# Patient Record
Sex: MALE | Race: AMERICAN INDIAN OR ALASKA NATIVE
[De-identification: names, ages, dates, MRNs, and addresses within clinical notes are randomized per-mention and may not be internally consistent; named-entity substitution may affect disease eponyms.]

---

## 2018-12-16 NOTE — NUR
PT CONFUSED AND AGITATED. DECLINED INSULIN AND SCHEDULAED MEDICATION. REFUSED
TO TO BE SWABBED TO R/O MRSA. THREATENING TO HIT THIS NURSE. HARD TO REDIRECT
AND REORIENT. WILL CONTINUE TO MONITOR.

## 2018-12-17 NOTE — NUR
SHIFT SUMMARY
PT A&O TO SELF, CONFUSED AND AGGITATED AND WOULD TRY TO HIT WHEN STAFF'S
PROVIDING CARE. REORIENTED AND REDIRECTED FREQUENTLY. IVF LR RUNNING. SLEPT ON
AND OFF. BED ALARM ON AND 3 SIDERAILS UP FOR SAFETY. CALL LIGHT IN REACH.
REPORT GIVEN TO DAY RN.

## 2018-12-17 NOTE — NUR
HE GOES BY [facility]. HE IS IN CONTACT ISOLATION FOR HX OF MRSA. HE IS CONFUSED AND
WAS COMBATIVE FIRST THING THIS MORNING WITH THE CNA BUT HAS BEEN MUCH MORE
PLEASANT WITH HER AND EVERYONE ELSE THE REST OF THE DAY. HE NEEDS ASSIST WITH
EATING. HIS ARMS AND HANDS ARE STIFF BUT HE CAN USE THEM TO FEED HIMSELF. DUE
TO DEMENTIA THOUGH HE STOPS EATING TOO QUICKLY AND LETS IT SIT THERE IF WE
DON'T FEED HIM. LOW GRADE FEVER THIS MORNING CORRECTED ON IT'S OWN. 2ND LITER
OF LACTATED RINGERS WILL BE DONE NEAR SHIFT CHANGE. HIS STEPSON WAS HERE TO
SEE HIM THIS MORNING. THE PALLIATIVE CARE NURSE ALSO TALKED TO HIM ON THE
PHONE. CODE STATUS WAS CORRECTED. WE ALSO RECEIVED A COPY OF HIS POLST FROM
[facility]. HE IS IINCONTINENT IN ATTENDS. WE WILL GET HIM UP TO THE CHAIR
NOW FOR DINNER WITH CHAIR ALARM IN PLACE. TEMP JUST NOW CHECKED. FEVER 100.2.

## 2018-12-17 NOTE — NUR
INITIAL [facility] CARE VISIT TO DETERMINE GOALS OF CARE, DISCUSS ADVANCED CARE
PLANNING AND CODE STATUS CLARIFICATION.  UPDATE RECEIVED FROM  AND RN PRIOR
TO MY VISIT.  PT WAS ASLEEP IN BED WHEN I CAME BY.  GIANFRANCO SON WAS IN EARLIER
AND I CONTACTED HIM BY PHONE.  HE REPORTS PT IS AT HIS BASELINE LEVEL OF
CONFUSION AND DISTRESS RE: "WHERE HIS THNGS ARE" AND "WHAT IS HAPPENING". EMR
REVIEWED AND NO DOCUMENTATION FOUND FOR AD, POLST OR CODE STATUS DESIRED.
GOALS OF CARE, ADVANCED CARE PLANNING AND CODE STATUS CLARIFIED WITH GIANFRANCO GERARDO.
HE STATES PT WOULD NOT WANT TO BE RESUSCITATED AND HAS HAD A DNR DIRECTIVE IN
PLACE FOR SOME TIME.  HE BELIEVES THERE IS DOCUMENTATION ON THIS AT [facility].  HE IS APPRECIATIVE OF THE CONVERSATION.  HE STATES ESTEE HAS A SIMILAR
INFECTION ABOUT A YEAR AGO AND HAS BEEN STABLE FOR THE MOST PART BUT SLOWLY
DECLINING.  WE DISCUSSED HOSPICE SERVICES AT [facility] IN THE FUTURE IF FAMILY AND
STAFF FELT REPEAT HOSPITALIZATION WAS TOO TRAUMATIZING TO PT.  JOSE WAS
ENCOURAGED TO DISCUSS EOL CARE WITH PT'S PCP AND [facility] STAFF AND HE AND
I DISCUSSED OPTIONS FOR OPTIMAL S/S MANAGEMENT. MARICELSON REQUESTS WE CHANGE
HIS CURRENT FULL CODE STATUS TO DNR.  T/C TO [facility] [place] TO REQUEST ANY
DOCUMENTATION THEY HAVE FOR POLST OR AD BE FAXED TO US.  T/C TO  AND RN WITH
REPORT OF ABOVE.   CHANGED CODE STATUS TO DNR.  [facility] AND PT'S STEP
SON INFORMED THAT PT MAY BE READY FOR D/C BACK TO [facility] IN THE NEXT DAY
OR TWO.  BOTH VERBALIZED UNDERSTANDING AND AGREEMENT WTIH THIS PLAN.

## 2018-12-18 NOTE — NUR
HE IS BEING FED DINNER. HE EATS AND DRINKS WELL. HE CHEWED A FEW PILLS TODAY.
MAY TRY APPLESAUCE IN THE FUTURE. SENNA GIVEN FOR NO BM IN 2 DAYS. HIS R ARM
IS HURTING HIM MORE TODAY THAN YESTERDAY. NO OTHER CHANGES.

## 2018-12-18 NOTE — NUR
HE HAS BEEN SLEEPING MORE TODAY THAN YESTERDAY. IT IS HARD TO GET A RELIABLE
TEMPERATURE READING ON HIM. HE IS LIGHTLY FLUSHED A LOT OF THE TIME. HE WAS
MORE FLUSHED BEFORE LUNCH. TEMPERATURE 99.0 BOTH TEMPORALLY AND ORALLY. HE
EATS AND DRINKS WELL. URINE SHOWS ESBL. ANTIBIOTIC CHANGED. CBG WAS OVER 400
AT LUNCHTIME. INSULIN ORDERS ALL INCREASED. HE REMAINS REGULARLY INCONTINENT
OF LARGE AMTS OF URINE.

## 2018-12-18 NOTE — NUR
SHIFT SUMMARY:  PT IS ALERT AND ORIENTED.  PT IS CALM AND COOPERATIVE WITH
CARE.  PT CALLS APPROPRIATELY.  PT REQUIRES LIFT FOR TRANSFERS, 2-3 PERSON
ASSIST FOR CHANGES.  PT HAD INCONTINENT BM OVERNIGHT, CHANGED AND CLEANED.  PT
WORE CPAP OVERNIGHT, O2 8 L BLEED IN.  PT DENIES PAIN, NAUSEA, AND VOMITING.
PT REPORTS INTERMITTENT SOB AT BASELINE.  PT SLEPT PERIODICALLY THROUGHOUT THE
NIGHT.  NO ACUTE CHANGES OR COMPLICATIONS THIS SHIFT.  BED IN LOW POSITION,
CALL LIGHT WITHIN REACH.

## 2018-12-18 NOTE — NUR
SHIFT SUMMARY:  PT IS ALERT TO SELF.  PT WAS CALM AND COOPERATIVE WITH CARE.
PT DID NOT USE HIS CALL LIGHT OVERNIGHT.  PT NOT OUT OF BED OVERNIGHT, MAX
ASSIST FOR TRANSFERS.  PT HAD INCONTINENT VOIDS OVERNIGHT, CHANGED AND CLEANED
AS NEEDED.  PT SLEPT PERIODICALLY THROUGHOUT THE NIGHT.  PT DENIES PAIN,
NAUSEA, VOMITING, AND SOB.  NO ACUTE CHANGES OR COMPLICATIONS THIS SHIFT.  BED
IN LOW POSITION, CALL LIGHT WITHIN REACH, BED ALARM SET.  WILL REPORT TO DAY
NURSE.

## 2018-12-19 NOTE — NUR
PT A&O TO SELF. VS WITHIN NORMAL LIMITS. PAIN IN RIGHT ARM TREATED PER MAR. PT
IS BEDBOUND AND Q2 TURN. INCONTINENT OF BOWEL AND BLADDER. PT SLEPT MOST OF
DAY AND DID NOT HAVE MUCH OF AN APPETITE. CALL LIGHT AND PERSONAL BELONGINGS
WITHIN REACH. REPORT GIVEN TO ONCOMING NURSE.

## 2018-12-19 NOTE — NUR
SHIFT SUMMARY
 
PT MAINTAINS LOW GRADE FEVERS, PT HAS TRENDED THAT WAY FOR MOST OF HIS
ADMISSION. TYLENOL GIVEN X1 THIS SHIFT. PT A/OX1 TO SELF AND FOR THE MOST PART
HAS BEEN PLESANT AND COOPERATIVE WITH CARE. CAN BE SOMEWHAT RESISTANT WITH
ATTENDS CHANGES. ASSESSMENT HAS REMAINED UNCHAGED. VITALS STABLE. SLEEPS T/O
THE NIGHT. WILL CONTINUE TO MONITOR AND REPORT TO ONCOMING RN.

## 2018-12-20 NOTE — NUR
SHIFT SUMMARY
PATIENT HAD NO ACUTE CHANGES OBSERVED DURING THE SHIFT. AXOX 1 TO SELF AND
BEDFAST. DENIES PAIN, SOB, AND N/V. PIV REMAINS INTACT. . VSS/AFEBRILE.
TAKES MEDS WHOLE WITH WATER. SLEPT MOST OF THE SHIFT. CALL LIGHT IN REACH. BED
IN LOWEST POSITION. WILL CONTINUE TO MONITOR UNTIL DAY SHIFT NURSE ASSUMES
CARE.

## 2018-12-20 NOTE — NUR
DISCHARGE NOTE
RESPIRATORY TREATMENT PROVIDED BEFORE TRANSPORT TO [facility]. POWERGLIDE TO
LEFT ARM PATENT AND SITE IS WNL. PT AWAKE, CONFUSED AT TIME OF DISCHARGE. RX
AND DISCHARGE INSTRUCTIONS FAXED TO [facility]. REPORT GIVEN.

## 2019-04-16 NOTE — NUR
2 PERSON ASSIST WITH CHANGING OF SOILED CLOTHING/BEDDING. PER DR SANTILLAN
VERBAL ORDER, OKAY TO PLACE CERNA CATHETER. PT TOLERATES WELL. CLEAR YELLOW
URINE OUTPUT NOTED. VSS. NADN. CAREGIVER REMAINS AT BEDSIDE. CALL LIGHT WITHIN
REACH. UPDATED ON TREAMENT STATUS

## 2019-04-16 NOTE — NUR
SHIFT SUMMARY
ASSUMED CARE OF PT AT APPROXIMATELY 1630. PT SNORING RESPIRATIONS AND ONLY
RESPONDS TO PAINFUL STIMULI. O2 SATS >92% ON 2L NC. VS STABLE. HR APPEARS TO
BE SA BLOCK WITH OCCASIONAL PVC WITH A RATE IN THE 60'S. ULCERATION TO LEFT
GREAT TOE APPROXIMATELY 2cm. RIGHT GREAT TOE ULCER COVERED WITH BANDAGE.
RIGHT GROIN SITE CLEAR FROM ANY SIGNS OF BLEEDING, HEMATOMA, OR NUMBNESS AND
TINGLING. PT TO STAY ON BEDREST FOR 3 HOURS POST ANGIOGRAM. DR. SANTILLAN CALLED
TO OBTAIN FURTHER ORDERS. WILL CONTINUE TO MONITOR AND REPORT TO ONCOMING RN.

## 2019-04-17 NOTE — NUR
REPORT TO [facility] / TRANSPORTATION:
REPORT HAS BEEN CALLED TO CAREGIVER AT [facility] THAT WILL BE ASSUMING CARE.
SHE DENIES FURTHER QUESTIONS & HAS BEEN INFORMED THAT D/C PACKET & INFO
REGARDING THE ANGIOSEAL, ETC, WILL BE SENT OUT W/ THE PT & TRANSPORT
PERSONNEL.
 
DEANGELO DUBOSE, DISCHARGE PLANNER RN, IS ARRANGING TRANSPORTATION BACK TO [facility]
FOR THIS PT. ANN WILL BE COMPLETING THE TRANSPORT, TIME REMAINS UNKNOWN.
WILL CONTINUE TO MONITOR & UPDATE AS NEEDED.

## 2019-04-17 NOTE — NUR
ASSUMED CARE:
REPORT RECEIVED FROM SOULEYMANE DUBOSE RN. ASSUMED CARE OF THIS PT AT APPROX 0700.
ON ASSESSMENT, THE PT IS AWAKE & PLEASANTLY CONFUSED. HE TALKS OF HAVING "A
HEART ATTACK AT [facility]" AS THOUGH HE IS NOT CURRENTLY HOSPITALIZED.
FREQUENT REORIENTATION HAS BEEN NECESSARY THIS AM & THE PT's WORDS ARE MOSTLY
NONSENSICAL & UNRELATED TO PRIOR CONVERSATION. HE IS DIFFICULT TO REDIRECT AT
TIMES. PLAN IS FOR D/C BACK TO PT's HOME, [facility], TODAY. HE REMAINS ON RA
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
NONCONDUCTED P WAVES NOTED. OCCASIONAL PVCs, HR 80s.
WILL CONTINUE TO MONITOR & UPDATE AS NEEDED.

## 2019-04-17 NOTE — NUR
SHIFT SUMMARY
PT ABLE TO ANSWER QUESTIONS WITH YES OR NO ANSWERS DURING THE NIGHT. HE HAD
SOME CLEARING AS SHIFT CONTINUED. HE IS STILL NOT ABLE TO APPROPRIATELY
RESPOND TO COMMANDS. PT HAS DENIED AND WAS NOT OBSERVED TO BE IN ANY PAIN. HE
SLEPT WELL DURING THE NIGHT AND VITALS WERE STABLE T/O THE SHIFT. PT GROIN
CONTINUE TO LOOKS GOOD WITHOUT ANY SIGNS OF BLEEDING. HE HAS BEEN ROUNDED ON
FREQUENTLY AS HE HAS NOT BEEN ABLE TO EFFECTIVELY MAKE NEEDS KNOWN. BED HAS
REMAINED IN THE LOWEST POSITION WITH 2X SIDE RAILS IN PLACE. PT HAS BEEN
SATTING  PERCENT ON O2 SO IT WAS TURNED OFF. AT ROOM AIR, PATIENT HAS
REMAINED ABOVE 95 PERCENT. HE WILL CONTINUE TO BE MONITORED UNTIL HANDOFF TO
DAYSHIFT RN.

## 2019-04-17 NOTE — NUR
DISCHARGE TO HOME ([facility]):
PIV & HEART MONITOR HAVE BEEN REMOVED. CERNA CATH HAS ALSO BEEN REMOVED &
ATTENDS PLACED ONTO PT WHO IS INCONTINENT OF BOTH BOWEL & BLADDER AT BASELINE.
HE HAS TOLERATED THESE INTERVENTIONS WELL, DID BECOME SLIGHTLY MORE AGITATED
WHEN ATTENDS PLACED & PT HAD TO ROLL ONTO SIDE. DRESSING TO R GROIN ACCESS
SITE IS WNL. AREA FREE OF BLEEDING, BRUISING OR HEMATOMA FORMATION AT
DISCHARGE. PLAN IS FOR  VAN TO PICK PT UP IN ADMITTING LOBBY AT 1400. HE HAS
NO CLOTHES W/ HIM AT THIS TIME & IS WEARING HOSPITAL GOWN FOR D/C. PT's
PERSONAL GLASSES & D/C PACKET TO BE TAKEN OUT W/ HIM.

## 2019-06-11 ENCOUNTER — HOSPITAL ENCOUNTER (OUTPATIENT)
Dept: HOSPITAL 95 - MHTC | Age: 84
Discharge: HOME | End: 2019-06-11
Attending: RADIOLOGY
Payer: MEDICARE

## 2019-06-11 VITALS — HEIGHT: 67 IN | WEIGHT: 185.19 LBS | BODY MASS INDEX: 29.07 KG/M2

## 2019-06-11 DIAGNOSIS — N18.2: ICD-10-CM

## 2019-06-11 DIAGNOSIS — E78.5: ICD-10-CM

## 2019-06-11 DIAGNOSIS — F17.220: ICD-10-CM

## 2019-06-11 DIAGNOSIS — Z79.899: ICD-10-CM

## 2019-06-11 DIAGNOSIS — I12.9: ICD-10-CM

## 2019-06-11 DIAGNOSIS — E11.69: ICD-10-CM

## 2019-06-11 DIAGNOSIS — E11.22: ICD-10-CM

## 2019-06-11 DIAGNOSIS — I70.201: ICD-10-CM

## 2019-06-11 DIAGNOSIS — M86.179: ICD-10-CM

## 2019-06-11 DIAGNOSIS — E11.51: Primary | ICD-10-CM

## 2019-06-11 PROCEDURE — C1887 CATHETER, GUIDING: HCPCS

## 2019-06-11 PROCEDURE — C1760 CLOSURE DEV, VASC: HCPCS

## 2019-06-11 PROCEDURE — C1894 INTRO/SHEATH, NON-LASER: HCPCS

## 2019-06-11 PROCEDURE — C1769 GUIDE WIRE: HCPCS

## 2019-06-11 PROCEDURE — C1725 CATH, TRANSLUMIN NON-LASER: HCPCS

## 2019-06-11 NOTE — NUR
PT BROUGHT TO RECOVERY ROOM POST PROCEDURE. LEFT FEMORAL ARTERIAL ACCESS SITE
APPEARS TO BE STABLE. ANGIOSEAL CLOSURE DEVICE USED. CLEAR TEGADERM INTACT.
VSS. CBG , LUNCH TRAY ORDERED, AWAITING ARRIVAL. PT APPEARS TO BE
COMFORTABLE AT THIS TIME, SNORING WITH EYES CLOSED. WILL CONTINUE TO MONITOR.

## 2019-06-11 NOTE — NUR
REPORT CALLED TO MANUELA GARCIA AT [facility]. INFORMED HER THAT PT WILL BE
ARRIVING BACK AT HOME AROUND [phone] [facility]. ALSO
INFORMED HER THAT PT WILL HAVE A PACKET OF DISCHARGE INFORMATION FOR HER TO
REVIEW. SHE VERBALIZED UNDERSTANDING.

## 2019-06-28 ENCOUNTER — HOSPITAL ENCOUNTER (EMERGENCY)
Dept: HOSPITAL 95 - ER | Age: 84
Discharge: HOME | End: 2019-06-28
Payer: MEDICARE

## 2019-06-28 VITALS — BODY MASS INDEX: 27.28 KG/M2 | WEIGHT: 180.01 LBS | HEIGHT: 68 IN

## 2019-06-28 DIAGNOSIS — E11.9: ICD-10-CM

## 2019-06-28 DIAGNOSIS — I10: ICD-10-CM

## 2019-06-28 DIAGNOSIS — Z87.891: ICD-10-CM

## 2019-06-28 DIAGNOSIS — J18.9: Primary | ICD-10-CM

## 2019-06-28 DIAGNOSIS — Z85.038: ICD-10-CM

## 2019-09-19 ENCOUNTER — HOSPITAL ENCOUNTER (OUTPATIENT)
Dept: HOSPITAL 95 - LAB SHORT | Age: 84
End: 2019-09-19
Attending: FAMILY MEDICINE
Payer: MEDICARE

## 2019-09-19 DIAGNOSIS — N39.0: Primary | ICD-10-CM

## 2019-09-19 LAB
PROT UR STRIP-MCNC: (no result) MG/DL
RBC #/AREA URNS HPF: (no result) /HPF (ref 0–2)
SP GR SPEC: 1.01 (ref 1–1.02)
UROBILINOGEN UR STRIP-MCNC: (no result) MG/DL
WBC #/AREA URNS HPF: (no result) /HPF (ref 0–5)

## 2019-09-24 ENCOUNTER — HOSPITAL ENCOUNTER (OUTPATIENT)
Dept: HOSPITAL 95 - LAB | Age: 84
Discharge: HOME | End: 2019-09-24
Attending: FAMILY MEDICINE
Payer: MEDICARE

## 2019-09-24 DIAGNOSIS — N39.0: Primary | ICD-10-CM

## 2020-02-17 ENCOUNTER — HOSPITAL ENCOUNTER (OUTPATIENT)
Dept: HOSPITAL 95 - LAB | Age: 85
Discharge: HOME | End: 2020-02-17
Attending: FAMILY MEDICINE
Payer: MEDICARE

## 2020-02-17 DIAGNOSIS — N39.0: Primary | ICD-10-CM

## 2020-03-02 ENCOUNTER — HOSPITAL ENCOUNTER (OUTPATIENT)
Dept: HOSPITAL 95 - LAB | Age: 85
Discharge: HOME | End: 2020-03-02
Attending: FAMILY MEDICINE
Payer: MEDICARE

## 2020-03-02 DIAGNOSIS — N39.0: Primary | ICD-10-CM

## 2021-11-30 ENCOUNTER — HOSPITAL ENCOUNTER (OUTPATIENT)
Dept: HOSPITAL 95 - LAB | Age: 86
End: 2021-11-30
Attending: PODIATRIST
Payer: MEDICARE

## 2021-11-30 DIAGNOSIS — M79.673: ICD-10-CM

## 2021-11-30 DIAGNOSIS — L97.509: ICD-10-CM

## 2021-11-30 DIAGNOSIS — E11.51: Primary | ICD-10-CM

## 2021-11-30 DIAGNOSIS — Z79.4: ICD-10-CM

## 2021-12-05 ENCOUNTER — HOSPITAL ENCOUNTER (EMERGENCY)
Dept: HOSPITAL 95 - ER | Age: 86
Discharge: HOME | End: 2021-12-05
Payer: MEDICARE

## 2021-12-05 VITALS — HEIGHT: 68 IN | BODY MASS INDEX: 25.76 KG/M2 | WEIGHT: 170 LBS
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
## 2021-12-05 DIAGNOSIS — E11.9: ICD-10-CM

## 2021-12-05 DIAGNOSIS — Z23: ICD-10-CM

## 2021-12-05 DIAGNOSIS — S00.03XA: Primary | ICD-10-CM

## 2021-12-05 DIAGNOSIS — Z79.899: ICD-10-CM

## 2021-12-05 DIAGNOSIS — Z79.82: ICD-10-CM

## 2021-12-05 DIAGNOSIS — Z88.0: ICD-10-CM

## 2021-12-05 DIAGNOSIS — I10: ICD-10-CM

## 2021-12-05 DIAGNOSIS — Z79.4: ICD-10-CM

## 2021-12-10 ENCOUNTER — HOSPITAL ENCOUNTER (EMERGENCY)
Dept: HOSPITAL 95 - ER | Age: 86
Discharge: HOME | End: 2021-12-10
Payer: MEDICARE

## 2021-12-10 VITALS — BODY MASS INDEX: 24.99 KG/M2 | WEIGHT: 150 LBS | HEIGHT: 65 IN

## 2021-12-10 DIAGNOSIS — E11.9: ICD-10-CM

## 2021-12-10 DIAGNOSIS — T50.905A: ICD-10-CM

## 2021-12-10 DIAGNOSIS — Z79.4: ICD-10-CM

## 2021-12-10 DIAGNOSIS — Z79.82: ICD-10-CM

## 2021-12-10 DIAGNOSIS — Z79.899: ICD-10-CM

## 2021-12-10 DIAGNOSIS — Z88.0: ICD-10-CM

## 2021-12-10 DIAGNOSIS — I44.1: Primary | ICD-10-CM

## 2021-12-10 DIAGNOSIS — F03.90: ICD-10-CM

## 2021-12-10 DIAGNOSIS — E87.5: ICD-10-CM

## 2021-12-10 DIAGNOSIS — I10: ICD-10-CM

## 2021-12-10 LAB
ALBUMIN SERPL BCP-MCNC: 3.1 G/DL (ref 3.4–5)
ALBUMIN/GLOB SERPL: 0.8 {RATIO} (ref 0.8–1.8)
ALT SERPL W P-5'-P-CCNC: 26 U/L (ref 12–78)
ANION GAP SERPL CALCULATED.4IONS-SCNC: 7 MMOL/L (ref 6–16)
AST SERPL W P-5'-P-CCNC: 27 U/L (ref 12–37)
BASOPHILS # BLD AUTO: 0.04 K/MM3 (ref 0–0.23)
BASOPHILS NFR BLD AUTO: 0 % (ref 0–2)
BILIRUB SERPL-MCNC: 0.3 MG/DL (ref 0.1–1)
BUN SERPL-MCNC: 39 MG/DL (ref 8–24)
CALCIUM SERPL-MCNC: 8.9 MG/DL (ref 8.5–10.1)
CHLORIDE SERPL-SCNC: 105 MMOL/L (ref 98–108)
CO2 SERPL-SCNC: 25 MMOL/L (ref 21–32)
CREAT SERPL-MCNC: 2.08 MG/DL (ref 0.6–1.2)
DEPRECATED RDW RBC AUTO: 45 FL (ref 35.1–46.3)
EOSINOPHIL # BLD AUTO: 0.01 K/MM3 (ref 0–0.68)
EOSINOPHIL NFR BLD AUTO: 0 % (ref 0–6)
ERYTHROCYTE [DISTWIDTH] IN BLOOD BY AUTOMATED COUNT: 12.7 % (ref 11.7–14.2)
GLOBULIN SER CALC-MCNC: 3.7 G/DL (ref 2.2–4)
GLUCOSE SERPL-MCNC: 178 MG/DL (ref 70–99)
HCT VFR BLD AUTO: 35.2 % (ref 37–53)
HGB BLD-MCNC: 11.2 G/DL (ref 13.5–17.5)
IMM GRANULOCYTES # BLD AUTO: 0.05 K/MM3 (ref 0–0.1)
IMM GRANULOCYTES NFR BLD AUTO: 1 % (ref 0–1)
KETONES UR STRIP-MCNC: (no result) MG/DL
LYMPHOCYTES # BLD AUTO: 1.83 K/MM3 (ref 0.84–5.2)
LYMPHOCYTES NFR BLD AUTO: 18 % (ref 21–46)
MCHC RBC AUTO-ENTMCNC: 31.8 G/DL (ref 31.5–36.5)
MCV RBC AUTO: 97 FL (ref 80–100)
MONOCYTES # BLD AUTO: 0.51 K/MM3 (ref 0.16–1.47)
MONOCYTES NFR BLD AUTO: 5 % (ref 4–13)
NEUTROPHILS # BLD AUTO: 7.69 K/MM3 (ref 1.96–9.15)
NEUTROPHILS NFR BLD AUTO: 76 % (ref 41–73)
NRBC # BLD AUTO: 0 K/MM3 (ref 0–0.02)
NRBC BLD AUTO-RTO: 0 /100 WBC (ref 0–0.2)
PLATELET # BLD AUTO: 363 K/MM3 (ref 150–400)
POTASSIUM SERPL-SCNC: 5.8 MMOL/L (ref 3.5–5.5)
PROT SERPL-MCNC: 6.8 G/DL (ref 6.4–8.2)
PROT UR STRIP-MCNC: (no result) MG/DL
RBC #/AREA URNS HPF: (no result) /HPF (ref 0–2)
SODIUM SERPL-SCNC: 137 MMOL/L (ref 136–145)
SP GR SPEC: 1.01 (ref 1–1.02)
UROBILINOGEN UR STRIP-MCNC: (no result) MG/DL
WBC #/AREA URNS HPF: (no result) /HPF (ref 0–5)

## 2022-01-12 ENCOUNTER — HOSPITAL ENCOUNTER (OUTPATIENT)
Dept: HOSPITAL 95 - MHTC | Age: 87
Discharge: HOME | End: 2022-01-12
Attending: STUDENT IN AN ORGANIZED HEALTH CARE EDUCATION/TRAINING PROGRAM
Payer: MEDICARE

## 2022-01-12 VITALS — HEIGHT: 63 IN | WEIGHT: 167.33 LBS | BODY MASS INDEX: 29.65 KG/M2

## 2022-01-12 DIAGNOSIS — K21.9: ICD-10-CM

## 2022-01-12 DIAGNOSIS — Z88.0: ICD-10-CM

## 2022-01-12 DIAGNOSIS — E78.5: ICD-10-CM

## 2022-01-12 DIAGNOSIS — E11.51: Primary | ICD-10-CM

## 2022-01-12 DIAGNOSIS — I10: ICD-10-CM

## 2022-01-12 DIAGNOSIS — E11.40: ICD-10-CM

## 2022-01-12 DIAGNOSIS — Z79.899: ICD-10-CM

## 2022-01-12 DIAGNOSIS — L97.929: ICD-10-CM

## 2022-01-12 DIAGNOSIS — I70.213: ICD-10-CM

## 2022-01-12 DIAGNOSIS — Z20.822: ICD-10-CM

## 2022-01-12 PROCEDURE — C1760 CLOSURE DEV, VASC: HCPCS

## 2022-01-12 PROCEDURE — C2623 CATH, TRANSLUMIN, DRUG-COAT: HCPCS

## 2022-01-12 PROCEDURE — C1894 INTRO/SHEATH, NON-LASER: HCPCS

## 2022-01-12 PROCEDURE — C1887 CATHETER, GUIDING: HCPCS

## 2022-01-12 PROCEDURE — C1769 GUIDE WIRE: HCPCS

## 2022-01-12 PROCEDURE — C1725 CATH, TRANSLUMIN NON-LASER: HCPCS

## 2022-01-12 NOTE — NUR
pt cbg 128 at this time. vss. pts bedding and disposable undergarment changed.
pt laying in bed with eyes closed. pt is easily arrousable. Son Pb
notified of outcome of procedure. will continue to monitor.

## 2022-01-12 NOTE — NUR
PT DRESSED WITH 2 RN ASSISTENCE. R FEMORAL ARTERY SITE IS STABLE WITH NO
BLEEDING, OOZING OR HEMATOMA NOTED. ALVAREZ PIMENTEL AT [facility] CALLED WITH DC
AND SITE CARE INSTRUCTIONS AND DENIES ANY QUESTIONS OR CONCERNS.VSS. IV DCD
WITH CATH INTACT. ALL BELONGINGS AND DC INSTRUCTIONS SENT WITH PATIENT. PT
TAKEN TO EXIT VIA PERSONAL WHEELCHAIR BY SECOND RN WHERE RIDE WAS WAITING.

## 2022-03-08 ENCOUNTER — HOSPITAL ENCOUNTER (OUTPATIENT)
Dept: HOSPITAL 95 - MHTC | Age: 87
Discharge: HOME | End: 2022-03-08
Attending: RADIOLOGY
Payer: MEDICARE

## 2022-03-08 DIAGNOSIS — E11.51: Primary | ICD-10-CM

## 2022-03-08 DIAGNOSIS — I70.223: ICD-10-CM

## 2022-03-08 DIAGNOSIS — K21.9: ICD-10-CM

## 2022-03-08 DIAGNOSIS — E11.40: ICD-10-CM

## 2022-03-08 DIAGNOSIS — Z88.0: ICD-10-CM

## 2022-03-08 DIAGNOSIS — I10: ICD-10-CM

## 2022-03-08 DIAGNOSIS — E78.5: ICD-10-CM

## 2022-03-08 DIAGNOSIS — Z87.891: ICD-10-CM

## 2022-03-08 DIAGNOSIS — Z79.4: ICD-10-CM

## 2022-03-08 PROCEDURE — C1760 CLOSURE DEV, VASC: HCPCS

## 2022-03-08 PROCEDURE — C1769 GUIDE WIRE: HCPCS

## 2022-03-08 PROCEDURE — C1894 INTRO/SHEATH, NON-LASER: HCPCS

## 2022-03-08 PROCEDURE — C1725 CATH, TRANSLUMIN NON-LASER: HCPCS

## 2022-03-08 PROCEDURE — C9772 REVASC LITHOTRIP TIBI/PERONE: HCPCS

## 2022-03-08 PROCEDURE — C1887 CATHETER, GUIDING: HCPCS

## 2022-03-08 NOTE — NUR
PATIENT TAKEN VIA PERSONAL WHEEL CHAIR TO ER ENTRANCE WHERE HE WILL BE
TRANSPORTED TO [facility] BY [facility] TRANSPORT.

## 2022-03-08 NOTE — NUR
PHONED [facility]. SPOKE TO MANE. GVE FULL REPORT TO HER. REVIEWED POST
PROCEDURE AND PRECAUTIONS WITH HER. UPDATED ON FOOD INTAKE, VOIDS, AND BMs. NO
FURTHER QUESTIONS.

## 2022-03-08 NOTE — NUR
PHONED [facility]. SPOKE TO NURSE LOREN TO RECONCILE MEDICATIONS FOR PATIENT.
LAST DOCUMENTED CBG WAS 67 LAST NIGHT. CBG DONE HERE IN THE RECOVERY ROOM. CBG
IS 60. REPORTED TO DR SANTILLAN. ONE AMP OF D5 ORDERED AND AN D5 1/2 NS ORDERED.

## 2022-03-08 NOTE — NUR
patient returned to [facility] recovery room from cath lab. left groin site
soft and nontender , no hematoma, no bleeding dressing D&I.

## 2022-03-08 NOTE — NUR
PHONED [facility] TRANSPORT TO GIVE THEM A TIME FOR PATIENT . THEY
WIOLL BE HERE AT 7 PM AT HEART CENTER RECOVERY ROOM.

## 2022-03-08 NOTE — NUR
[facility]ED [facility] WITH REPORT. SPOKE TO [employer]. REVIEWED DISCHARGE
INSTRUCTIONS AND PRECAUTIONS AND PROCEDURAL REPORT. REVIEWED BLOOD SUGARS AND
MEDICATIONS, INCLUDING NEW MEDICATION.

## 2022-04-29 ENCOUNTER — HOSPITAL ENCOUNTER (OUTPATIENT)
Dept: HOSPITAL 95 - WOUND | Age: 87
Discharge: HOME | End: 2022-04-29
Attending: SURGERY
Payer: MEDICARE

## 2022-04-29 DIAGNOSIS — N18.6: ICD-10-CM

## 2022-04-29 DIAGNOSIS — L97.526: ICD-10-CM

## 2022-04-29 DIAGNOSIS — L97.512: ICD-10-CM

## 2022-04-29 DIAGNOSIS — L97.511: ICD-10-CM

## 2022-04-29 DIAGNOSIS — L97.822: ICD-10-CM

## 2022-04-29 DIAGNOSIS — E11.51: ICD-10-CM

## 2022-04-29 DIAGNOSIS — E11.621: Primary | ICD-10-CM

## 2022-04-29 DIAGNOSIS — Z88.0: ICD-10-CM

## 2022-04-29 DIAGNOSIS — K21.9: ICD-10-CM

## 2022-04-29 DIAGNOSIS — I12.0: ICD-10-CM

## 2022-04-29 DIAGNOSIS — E11.42: ICD-10-CM

## 2022-04-29 DIAGNOSIS — Z87.891: ICD-10-CM

## 2022-04-29 DIAGNOSIS — E78.5: ICD-10-CM

## 2022-04-29 DIAGNOSIS — E11.22: ICD-10-CM

## 2022-04-29 DIAGNOSIS — E11.622: ICD-10-CM

## 2022-04-29 DIAGNOSIS — F03.90: ICD-10-CM

## 2022-04-29 DIAGNOSIS — Z85.038: ICD-10-CM

## 2022-04-29 PROCEDURE — G0463 HOSPITAL OUTPT CLINIC VISIT: HCPCS

## 2022-04-29 PROCEDURE — A9270 NON-COVERED ITEM OR SERVICE: HCPCS

## 2022-05-06 ENCOUNTER — HOSPITAL ENCOUNTER (OUTPATIENT)
Dept: HOSPITAL 95 - WOUND | Age: 87
Discharge: HOME | End: 2022-05-06
Attending: SURGERY
Payer: MEDICARE

## 2022-05-06 DIAGNOSIS — L97.512: ICD-10-CM

## 2022-05-06 DIAGNOSIS — L89.899: ICD-10-CM

## 2022-05-06 DIAGNOSIS — L97.822: ICD-10-CM

## 2022-05-06 DIAGNOSIS — E10.621: Primary | ICD-10-CM

## 2022-05-06 DIAGNOSIS — E10.51: ICD-10-CM

## 2022-05-06 DIAGNOSIS — E10.42: ICD-10-CM

## 2022-05-06 DIAGNOSIS — L97.526: ICD-10-CM

## 2022-05-06 PROCEDURE — G0463 HOSPITAL OUTPT CLINIC VISIT: HCPCS

## 2022-05-13 ENCOUNTER — HOSPITAL ENCOUNTER (OUTPATIENT)
Dept: HOSPITAL 95 - WOUND | Age: 87
Discharge: HOME | End: 2022-05-13
Attending: SURGERY
Payer: MEDICARE

## 2022-05-13 DIAGNOSIS — S91.101S: ICD-10-CM

## 2022-05-13 DIAGNOSIS — S91.104S: ICD-10-CM

## 2022-05-13 DIAGNOSIS — L97.512: ICD-10-CM

## 2022-05-13 DIAGNOSIS — E10.51: ICD-10-CM

## 2022-05-13 DIAGNOSIS — E10.621: Primary | ICD-10-CM

## 2022-05-13 DIAGNOSIS — L97.526: ICD-10-CM

## 2022-05-13 DIAGNOSIS — E10.42: ICD-10-CM
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
## 2022-05-13 DIAGNOSIS — S91.105S: ICD-10-CM

## 2022-05-13 DIAGNOSIS — L97.822: ICD-10-CM

## 2022-05-13 PROCEDURE — A9270 NON-COVERED ITEM OR SERVICE: HCPCS

## 2022-05-13 PROCEDURE — G0463 HOSPITAL OUTPT CLINIC VISIT: HCPCS

## 2022-05-18 ENCOUNTER — HOSPITAL ENCOUNTER (INPATIENT)
Dept: HOSPITAL 95 - ER | Age: 87
LOS: 2 days | Discharge: HOSPICE | DRG: 682 | End: 2022-05-20
Attending: INTERNAL MEDICINE | Admitting: INTERNAL MEDICINE
Payer: MEDICARE

## 2022-05-18 VITALS — BODY MASS INDEX: 23.25 KG/M2 | WEIGHT: 139.55 LBS | HEIGHT: 65 IN

## 2022-05-18 DIAGNOSIS — Z98.890: ICD-10-CM

## 2022-05-18 DIAGNOSIS — N18.30: ICD-10-CM

## 2022-05-18 DIAGNOSIS — Z88.0: ICD-10-CM

## 2022-05-18 DIAGNOSIS — E11.22: ICD-10-CM

## 2022-05-18 DIAGNOSIS — Z79.4: ICD-10-CM

## 2022-05-18 DIAGNOSIS — Z66: ICD-10-CM

## 2022-05-18 DIAGNOSIS — F03.90: ICD-10-CM

## 2022-05-18 DIAGNOSIS — E78.5: ICD-10-CM

## 2022-05-18 DIAGNOSIS — I12.9: ICD-10-CM

## 2022-05-18 DIAGNOSIS — F32.A: ICD-10-CM

## 2022-05-18 DIAGNOSIS — E86.0: ICD-10-CM

## 2022-05-18 DIAGNOSIS — K92.2: ICD-10-CM

## 2022-05-18 DIAGNOSIS — Z79.899: ICD-10-CM

## 2022-05-18 DIAGNOSIS — D63.1: ICD-10-CM

## 2022-05-18 DIAGNOSIS — E11.51: ICD-10-CM

## 2022-05-18 DIAGNOSIS — N17.9: Primary | ICD-10-CM

## 2022-05-18 DIAGNOSIS — E87.0: ICD-10-CM

## 2022-05-18 DIAGNOSIS — E03.9: ICD-10-CM

## 2022-05-18 DIAGNOSIS — Z51.5: ICD-10-CM

## 2022-05-18 DIAGNOSIS — Z79.01: ICD-10-CM

## 2022-05-18 DIAGNOSIS — Z79.82: ICD-10-CM

## 2022-05-18 DIAGNOSIS — D50.9: ICD-10-CM

## 2022-05-18 DIAGNOSIS — E11.40: ICD-10-CM

## 2022-05-18 DIAGNOSIS — D72.829: ICD-10-CM

## 2022-05-18 DIAGNOSIS — Z85.038: ICD-10-CM

## 2022-05-18 DIAGNOSIS — G92.8: ICD-10-CM

## 2022-05-18 LAB
ALBUMIN SERPL BCP-MCNC: 3 G/DL (ref 3.4–5)
ALBUMIN/GLOB SERPL: 0.7 {RATIO} (ref 0.8–1.8)
ALT SERPL W P-5'-P-CCNC: 42 U/L (ref 12–78)
ANION GAP SERPL CALCULATED.4IONS-SCNC: 0 MMOL/L (ref 6–16)
ANION GAP SERPL CALCULATED.4IONS-SCNC: 5 MMOL/L (ref 6–16)
AST SERPL W P-5'-P-CCNC: 47 U/L (ref 12–37)
BASOPHILS # BLD AUTO: 0.06 K/MM3 (ref 0–0.23)
BASOPHILS NFR BLD AUTO: 0 % (ref 0–2)
BILIRUB SERPL-MCNC: 0.6 MG/DL (ref 0.1–1)
BUN SERPL-MCNC: 86 MG/DL (ref 8–24)
BUN SERPL-MCNC: 87 MG/DL (ref 8–24)
CALCIUM SERPL-MCNC: 8.1 MG/DL (ref 8.5–10.1)
CALCIUM SERPL-MCNC: 9.1 MG/DL (ref 8.5–10.1)
CHLORIDE SERPL-SCNC: 129 MMOL/L (ref 98–108)
CHLORIDE SERPL-SCNC: 130 MMOL/L (ref 98–108)
CO2 SERPL-SCNC: 24 MMOL/L (ref 21–32)
CO2 SERPL-SCNC: 28 MMOL/L (ref 21–32)
CREAT SERPL-MCNC: 3.65 MG/DL (ref 0.6–1.2)
CREAT SERPL-MCNC: 3.86 MG/DL (ref 0.6–1.2)
DEPRECATED RDW RBC AUTO: 50.5 FL (ref 35.1–46.3)
EOSINOPHIL # BLD AUTO: 0.15 K/MM3 (ref 0–0.68)
EOSINOPHIL NFR BLD AUTO: 1 % (ref 0–6)
ERYTHROCYTE [DISTWIDTH] IN BLOOD BY AUTOMATED COUNT: 13.2 % (ref 11.7–14.2)
GLOBULIN SER CALC-MCNC: 4.5 G/DL (ref 2.2–4)
GLUCOSE SERPL-MCNC: 67 MG/DL (ref 70–99)
GLUCOSE SERPL-MCNC: 93 MG/DL (ref 70–99)
HCT VFR BLD AUTO: 42.6 % (ref 37–53)
HGB BLD-MCNC: 12.9 G/DL (ref 13.5–17.5)
IMM GRANULOCYTES # BLD AUTO: 0.05 K/MM3 (ref 0–0.1)
IMM GRANULOCYTES NFR BLD AUTO: 0 % (ref 0–1)
LYMPHOCYTES # BLD AUTO: 2.14 K/MM3 (ref 0.84–5.2)
LYMPHOCYTES NFR BLD AUTO: 14 % (ref 21–46)
MCHC RBC AUTO-ENTMCNC: 30.3 G/DL (ref 31.5–36.5)
MCV RBC AUTO: 103 FL (ref 80–100)
MONOCYTES # BLD AUTO: 1.5 K/MM3 (ref 0.16–1.47)
MONOCYTES NFR BLD AUTO: 10 % (ref 4–13)
NEUTROPHILS # BLD AUTO: 11.13 K/MM3 (ref 1.96–9.15)
NEUTROPHILS NFR BLD AUTO: 74 % (ref 41–73)
NRBC # BLD AUTO: 0 K/MM3 (ref 0–0.02)
NRBC BLD AUTO-RTO: 0 /100 WBC (ref 0–0.2)
PLATELET # BLD AUTO: 331 K/MM3 (ref 150–400)
POTASSIUM SERPL-SCNC: 4.5 MMOL/L (ref 3.5–5.5)
POTASSIUM SERPL-SCNC: 5.2 MMOL/L (ref 3.5–5.5)
PROT SERPL-MCNC: 7.5 G/DL (ref 6.4–8.2)
PROT UR STRIP-MCNC: (no result) MG/DL
SODIUM SERPL-SCNC: 157 MMOL/L (ref 136–145)
SODIUM SERPL-SCNC: 159 MMOL/L (ref 136–145)
SP GR SPEC: 1.02 (ref 1–1.02)
UROBILINOGEN UR STRIP-MCNC: (no result) MG/DL
WBC #/AREA URNS HPF: (no result) /HPF (ref 0–5)

## 2022-05-18 PROCEDURE — G0378 HOSPITAL OBSERVATION PER HR: HCPCS

## 2022-05-18 PROCEDURE — A9270 NON-COVERED ITEM OR SERVICE: HCPCS

## 2022-05-18 NOTE — NUR
Transfer report from ER ALVAREZ Sanchez on 89 year old PT with Acute on chronic kidney
injury Dementia & hx of ESBL MRSA & wounds.Will be in contact isolation.
Coming from [facility] Dementia unit. DNR status, randolph placed in ER. Await
admission.

## 2022-05-19 LAB
ALBUMIN SERPL BCP-MCNC: 2.5 G/DL (ref 3.4–5)
ALBUMIN/GLOB SERPL: 0.7 {RATIO} (ref 0.8–1.8)
ALT SERPL W P-5'-P-CCNC: 36 U/L (ref 12–78)
ANION GAP SERPL CALCULATED.4IONS-SCNC: 6 MMOL/L (ref 6–16)
AST SERPL W P-5'-P-CCNC: 46 U/L (ref 12–37)
BASOPHILS # BLD: 0 K/MM3 (ref 0–0.23)
BASOPHILS NFR BLD: 0 % (ref 0–2)
BILIRUB SERPL-MCNC: 0.9 MG/DL (ref 0.1–1)
BUN SERPL-MCNC: 78 MG/DL (ref 8–24)
CALCIUM SERPL-MCNC: 8.1 MG/DL (ref 8.5–10.1)
CHLORIDE SERPL-SCNC: 128 MMOL/L (ref 98–108)
CO2 SERPL-SCNC: 22 MMOL/L (ref 21–32)
CREAT SERPL-MCNC: 3.37 MG/DL (ref 0.6–1.2)
DEPRECATED RDW RBC AUTO: 50.1 FL (ref 35.1–46.3)
EOSINOPHIL # BLD: 0.21 K/MM3 (ref 0–0.68)
EOSINOPHIL NFR BLD: 2 % (ref 0–6)
ERYTHROCYTE [DISTWIDTH] IN BLOOD BY AUTOMATED COUNT: 13.2 % (ref 11.7–14.2)
GLOBULIN SER CALC-MCNC: 3.5 G/DL (ref 2.2–4)
GLUCOSE SERPL-MCNC: 109 MG/DL (ref 70–99)
HCT VFR BLD AUTO: 34.7 % (ref 37–53)
HCT VFR BLD AUTO: 34.8 % (ref 37–53)
HGB BLD-MCNC: 10.6 G/DL (ref 13.5–17.5)
HGB BLD-MCNC: 10.7 G/DL (ref 13.5–17.5)
LYMPHOCYTES # BLD: 1.83 K/MM3 (ref 0.84–5.2)
LYMPHOCYTES NFR BLD: 17 % (ref 21–46)
MCHC RBC AUTO-ENTMCNC: 30.5 G/DL (ref 31.5–36.5)
MCV RBC AUTO: 105 FL (ref 80–100)
MONOCYTES # BLD: 0.43 K/MM3 (ref 0.16–1.47)
MONOCYTES NFR BLD: 4 % (ref 4–13)
NEUTS BAND NFR BLD MANUAL: 12 % (ref 0–8)
NEUTS SEG # BLD MANUAL: 8.3 K/MM3 (ref 1.96–9.15)
NEUTS SEG NFR BLD MANUAL: 65 % (ref 41–73)
NRBC # BLD AUTO: 0 K/MM3 (ref 0–0.02)
NRBC BLD AUTO-RTO: 0 /100 WBC (ref 0–0.2)
PLATELET # BLD AUTO: 251 K/MM3 (ref 150–400)
POTASSIUM SERPL-SCNC: 4.1 MMOL/L (ref 3.5–5.5)
PROT SERPL-MCNC: 6 G/DL (ref 6.4–8.2)
SODIUM SERPL-SCNC: 156 MMOL/L (ref 136–145)
TOTAL CELLS COUNTED BLD: 100

## 2022-05-19 NOTE — NUR
CALLED DR SOUSA-
PT HAD A LARGE BLACK TARRY STOOL. CALLED DR SOUSA AND RECIEVED AN ORDER FOR A
GUIAC STOOL AND A STAT H&H. PT HAS NO OTHER SYMPTOMS AT THIS TIME. CALLED
PALLIATIVE CARE AND SHE IS AWARE. PLAN IS FOR PT TO DC BACK TO [facility]
TOMORROW ON HOSPICE. WILL CTM.

## 2022-05-19 NOTE — NUR
SHIFT SUMMARY
PATIENT CONFUSED. UNABLE TO FOLLOW COMMANDS, VERY LIMITED VERBAL RESPONSE.
SPEECH THERAPY ASSESSED PATIENT AND ADVANCED DIET. PT D5W INCREASED TO
125ML/HR. DARK STOOL NOTED WITH LAST BM, OCCULT STOOL ORDERED AND COLLECTED
ALONG WITH HGB. CERNA IN PLACE. CALL LIGHT WITHIN REACH, TABLE AT BEDSIDE.
WILL CONTINUE TO MONITOR.

## 2022-05-20 LAB
ALBUMIN SERPL BCP-MCNC: 2.4 G/DL (ref 3.4–5)
ALBUMIN/GLOB SERPL: 0.7 {RATIO} (ref 0.8–1.8)
ALT SERPL W P-5'-P-CCNC: 33 U/L (ref 12–78)
ANION GAP SERPL CALCULATED.4IONS-SCNC: 5 MMOL/L (ref 6–16)
AST SERPL W P-5'-P-CCNC: 42 U/L (ref 12–37)
BASOPHILS # BLD: 0.09 K/MM3 (ref 0–0.23)
BASOPHILS NFR BLD: 1 % (ref 0–2)
BILIRUB SERPL-MCNC: 1.4 MG/DL (ref 0.1–1)
BUN SERPL-MCNC: 62 MG/DL (ref 8–24)
CALCIUM SERPL-MCNC: 7.7 MG/DL (ref 8.5–10.1)
CHLORIDE SERPL-SCNC: 117 MMOL/L (ref 98–108)
CO2 SERPL-SCNC: 25 MMOL/L (ref 21–32)
CREAT SERPL-MCNC: 2.96 MG/DL (ref 0.6–1.2)
DEPRECATED RDW RBC AUTO: 45.7 FL (ref 35.1–46.3)
EOSINOPHIL # BLD: 0 K/MM3 (ref 0–0.68)
EOSINOPHIL NFR BLD: 0 % (ref 0–6)
ERYTHROCYTE [DISTWIDTH] IN BLOOD BY AUTOMATED COUNT: 12.8 % (ref 11.7–14.2)
GLOBULIN SER CALC-MCNC: 3.5 G/DL (ref 2.2–4)
GLUCOSE SERPL-MCNC: 151 MG/DL (ref 70–99)
HCT VFR BLD AUTO: 31.6 % (ref 37–53)
HGB BLD-MCNC: 10 G/DL (ref 13.5–17.5)
LYMPHOCYTES # BLD: 1.84 K/MM3 (ref 0.84–5.2)
LYMPHOCYTES NFR BLD: 20 % (ref 21–46)
MCHC RBC AUTO-ENTMCNC: 31.6 G/DL (ref 31.5–36.5)
MCV RBC AUTO: 99 FL (ref 80–100)
MONOCYTES # BLD: 0.82 K/MM3 (ref 0.16–1.47)
MONOCYTES NFR BLD: 9 % (ref 4–13)
NEUTS BAND NFR BLD MANUAL: 5 % (ref 0–8)
NEUTS SEG # BLD MANUAL: 6.44 K/MM3 (ref 1.96–9.15)
NEUTS SEG NFR BLD MANUAL: 65 % (ref 41–73)
NRBC # BLD AUTO: 0 K/MM3 (ref 0–0.02)
NRBC BLD AUTO-RTO: 0 /100 WBC (ref 0–0.2)
PLATELET # BLD AUTO: 220 K/MM3 (ref 150–400)
POTASSIUM SERPL-SCNC: 4.3 MMOL/L (ref 3.5–5.5)
PROT SERPL-MCNC: 5.9 G/DL (ref 6.4–8.2)
SODIUM SERPL-SCNC: 147 MMOL/L (ref 136–145)
TOTAL CELLS COUNTED BLD: 100

## 2022-05-20 NOTE — NUR
DISCHARGE NOTE-
IV DC'D PRIOR TO DISCHARGE. PT TAKEN VIA GURNEY TRANSPORT BACK TO [facility]
WITH THE PLAN TO ADMIT TO HOSPICE MONDAY.